# Patient Record
Sex: MALE | Race: WHITE | Employment: OTHER | URBAN - METROPOLITAN AREA
[De-identification: names, ages, dates, MRNs, and addresses within clinical notes are randomized per-mention and may not be internally consistent; named-entity substitution may affect disease eponyms.]

---

## 2018-06-22 ENCOUNTER — LAB ENCOUNTER (OUTPATIENT)
Dept: LAB | Age: 53
End: 2018-06-22
Attending: FAMILY MEDICINE
Payer: MEDICAID

## 2018-06-22 ENCOUNTER — OFFICE VISIT (OUTPATIENT)
Dept: FAMILY MEDICINE CLINIC | Facility: CLINIC | Age: 53
End: 2018-06-22

## 2018-06-22 VITALS
HEART RATE: 56 BPM | RESPIRATION RATE: 16 BRPM | SYSTOLIC BLOOD PRESSURE: 110 MMHG | DIASTOLIC BLOOD PRESSURE: 72 MMHG | BODY MASS INDEX: 27.51 KG/M2 | WEIGHT: 190 LBS | HEIGHT: 69.5 IN | TEMPERATURE: 98 F

## 2018-06-22 DIAGNOSIS — Z12.5 SCREENING FOR PROSTATE CANCER: ICD-10-CM

## 2018-06-22 DIAGNOSIS — R53.83 FATIGUE, UNSPECIFIED TYPE: ICD-10-CM

## 2018-06-22 DIAGNOSIS — Z00.00 LABORATORY EXAMINATION ORDERED AS PART OF A ROUTINE GENERAL MEDICAL EXAMINATION: ICD-10-CM

## 2018-06-22 DIAGNOSIS — R22.9 SUBCUTANEOUS NODULE: ICD-10-CM

## 2018-06-22 DIAGNOSIS — Z13.89 SCREENING FOR GENITOURINARY CONDITION: ICD-10-CM

## 2018-06-22 DIAGNOSIS — Z00.00 PHYSICAL EXAM, ANNUAL: Primary | ICD-10-CM

## 2018-06-22 DIAGNOSIS — L40.9 PSORIASIS: ICD-10-CM

## 2018-06-22 DIAGNOSIS — F32.A DEPRESSION, UNSPECIFIED DEPRESSION TYPE: ICD-10-CM

## 2018-06-22 PROCEDURE — 99386 PREV VISIT NEW AGE 40-64: CPT | Performed by: FAMILY MEDICINE

## 2018-06-22 PROCEDURE — 85025 COMPLETE CBC W/AUTO DIFF WBC: CPT

## 2018-06-22 PROCEDURE — 80053 COMPREHEN METABOLIC PANEL: CPT

## 2018-06-22 PROCEDURE — 80061 LIPID PANEL: CPT

## 2018-06-22 PROCEDURE — 84443 ASSAY THYROID STIM HORMONE: CPT

## 2018-06-22 PROCEDURE — 81003 URINALYSIS AUTO W/O SCOPE: CPT

## 2018-06-22 PROCEDURE — 82306 VITAMIN D 25 HYDROXY: CPT

## 2018-06-22 PROCEDURE — 82607 VITAMIN B-12: CPT

## 2018-06-22 PROCEDURE — 36415 COLL VENOUS BLD VENIPUNCTURE: CPT

## 2018-06-22 PROCEDURE — 84153 ASSAY OF PSA TOTAL: CPT

## 2018-06-22 RX ORDER — TRAZODONE HYDROCHLORIDE 100 MG/1
100 TABLET ORAL NIGHTLY
COMMUNITY
End: 2018-08-20 | Stop reason: ALTCHOICE

## 2018-06-22 RX ORDER — CLONAZEPAM 0.5 MG/1
0.5 TABLET ORAL DAILY
COMMUNITY
End: 2019-06-03

## 2018-06-22 RX ORDER — SERTRALINE HYDROCHLORIDE 100 MG/1
100 TABLET, FILM COATED ORAL 2 TIMES DAILY
COMMUNITY
End: 2019-09-23 | Stop reason: ALTCHOICE

## 2018-06-22 RX ORDER — ARIPIPRAZOLE 15 MG/1
15 TABLET ORAL DAILY
COMMUNITY
End: 2019-06-03 | Stop reason: DRUGHIGH

## 2018-06-22 NOTE — PROGRESS NOTES
Bobbi Bullock is a 48year old male who presents for a complete physical exam.   HPI:   Pt complains of being treated for depression. He is working with psychiatrist Dr Jessica Beltran on his mood condition.   No suicidal.  He says that it is a otherwise  SKIN: denies any unusual skin lesions  EYES:denies blurred vision or double vision  HEENT: denies nasal congestion, sinus pain or ST  LUNGS: denies shortness of breath with exertion  CARDIOVASCULAR: denies chest pain on exertion  GI: denies abdo TSH W REFLEX TO FREE T4      VITAMIN D, 25-HYDROXY      VITAMIN B12    Meds & Refills for this Visit:  No prescriptions requested or ordered in this encounter     Call [phone] to schedule US of the lump chest.   Healthy diet. Stay active.   Forward r

## 2018-06-22 NOTE — PATIENT INSTRUCTIONS
Call [phone] to schedule US of the lump chest.   Healthy diet. Stay active. Forward records from the previous doctor for the last 2 years.

## 2018-06-25 ENCOUNTER — TELEPHONE (OUTPATIENT)
Dept: FAMILY MEDICINE CLINIC | Facility: CLINIC | Age: 53
End: 2018-06-25

## 2018-06-25 NOTE — TELEPHONE ENCOUNTER
Medical Records request signed in office. Sent to: [facility], [facility]  [address], [address] [facility]  P @ [phone]  F @ [phone]    Faxed on 6/25/18 for records for continuation of care.      Release sent to scan to be put in pat

## 2018-06-26 NOTE — TELEPHONE ENCOUNTER
Medical Records received from [facility], [facility] on 6/26/18. Given to Dr. Michaud for review.

## 2018-07-03 ENCOUNTER — TELEPHONE (OUTPATIENT)
Dept: FAMILY MEDICINE CLINIC | Facility: CLINIC | Age: 53
End: 2018-07-03

## 2018-07-03 NOTE — TELEPHONE ENCOUNTER
Call to pt-sts dr Tone Larkin discussed polish speaking psychiatrist w him at last ofc visit. Mayda Jose gave me a business card for a dr but I keep calling and no one answers. \"  Socrates Larkin is out of ofc today-with holiday, will return Thursday, 7/ Per EPIC Pt had INR done today 3/15/18

## 2018-07-03 NOTE — TELEPHONE ENCOUNTER
Call to pt-advised of dr nova's recommendations noted below and explained rationale. Patient voices understanding/agrees with plan/no further questions.

## 2018-07-03 NOTE — TELEPHONE ENCOUNTER
I think patient needs to at this point  Needs to call his insurance and find out who will be a psychiatrist in his network possibly Adi speaking he can see.   There is Dr. Iraj Brown at [facility] speaking psychiatrist but I am not sure if they acc

## 2018-07-03 NOTE — TELEPHONE ENCOUNTER
Patient is asking for the name of the Psychiatrist that Dr. Kinsey Duran recommended that speaks [place]. Please advise at [phone]. Thank you.

## 2018-07-17 ENCOUNTER — TELEPHONE (OUTPATIENT)
Dept: FAMILY MEDICINE CLINIC | Facility: CLINIC | Age: 53
End: 2018-07-17

## 2018-07-17 NOTE — TELEPHONE ENCOUNTER
Left msg to      The call is to confirm that order was US of chest (not chest xray) to evaluate a subcutaneous nodule at his left upper chest wall. (see notes from 6/22 OV)

## 2018-07-17 NOTE — TELEPHONE ENCOUNTER
Pt is asking what he should do next. There is a US ordered but he thought he was supposed to do a xray. Please advise. Thank you.

## 2018-07-31 ENCOUNTER — HOSPITAL ENCOUNTER (OUTPATIENT)
Dept: ULTRASOUND IMAGING | Facility: HOSPITAL | Age: 53
Discharge: HOME | End: 2018-07-31
Attending: FAMILY MEDICINE
Payer: MEDICAID

## 2018-07-31 DIAGNOSIS — R22.9 SUBCUTANEOUS NODULE: ICD-10-CM

## 2018-07-31 PROCEDURE — 76705 ECHO EXAM OF ABDOMEN: CPT | Performed by: FAMILY MEDICINE

## 2018-08-16 ENCOUNTER — TELEPHONE (OUTPATIENT)
Dept: FAMILY MEDICINE CLINIC | Facility: CLINIC | Age: 53
End: 2018-08-16

## 2018-08-16 NOTE — TELEPHONE ENCOUNTER
Can we check with patient tomorrow if  we would have opening  on Monday,  Or we can try to see if he would have a cancellation for tomorrow then we will call him for visit. I would prefer have an hour however for him.     He would have to call his psychia

## 2018-08-16 NOTE — TELEPHONE ENCOUNTER
See providers notes    Call pt estefani    On 8/20 has HFU , we can use his estefani if still available

## 2018-08-16 NOTE — TELEPHONE ENCOUNTER
Pt wanting an appt to discuss US of lower back    Also, reported feeling---  Insomia   Anxious  Mood changes   Easily angered  No suicidal nor homicidal ideation     He saw his psyche last Mon  On Zoloft, Buspar, Trazadone, Clonazepam and Ambien  Will see

## 2018-08-17 NOTE — TELEPHONE ENCOUNTER
MARIO for pt to cb. Please offer him Monday 330 p to disucss u/s results and be worked up for [place], frequent urination--it looks like this spot is being held for pt per Dr Judd Loveless schedule.   Per notes below regarding mood, he needs to still continue with pys

## 2018-08-20 ENCOUNTER — OFFICE VISIT (OUTPATIENT)
Dept: FAMILY MEDICINE CLINIC | Facility: CLINIC | Age: 53
End: 2018-08-20
Payer: MEDICAID

## 2018-08-20 VITALS
HEART RATE: 66 BPM | RESPIRATION RATE: 16 BRPM | SYSTOLIC BLOOD PRESSURE: 118 MMHG | DIASTOLIC BLOOD PRESSURE: 76 MMHG | HEIGHT: 69.5 IN | TEMPERATURE: 97 F | WEIGHT: 200 LBS | BODY MASS INDEX: 28.96 KG/M2

## 2018-08-20 DIAGNOSIS — R14.0 ABDOMINAL BLOATING: ICD-10-CM

## 2018-08-20 DIAGNOSIS — L40.9 PSORIASIS: ICD-10-CM

## 2018-08-20 DIAGNOSIS — F32.A DEPRESSION, UNSPECIFIED DEPRESSION TYPE: ICD-10-CM

## 2018-08-20 DIAGNOSIS — H04.203 EYE TEARING, BILATERAL: Primary | ICD-10-CM

## 2018-08-20 DIAGNOSIS — R53.83 FATIGUE, UNSPECIFIED TYPE: ICD-10-CM

## 2018-08-20 PROCEDURE — 99214 OFFICE O/P EST MOD 30 MIN: CPT | Performed by: FAMILY MEDICINE

## 2018-08-20 RX ORDER — FLUTICASONE PROPIONATE 50 MCG
2 SPRAY, SUSPENSION (ML) NASAL DAILY
Qty: 1 BOTTLE | Refills: 1 | Status: SHIPPED | OUTPATIENT
Start: 2018-08-20 | End: 2019-02-21

## 2018-08-20 RX ORDER — FLUTICASONE PROPIONATE 50 MCG
2 SPRAY, SUSPENSION (ML) NASAL DAILY
Qty: 1 BOTTLE | Refills: 2 | Status: SHIPPED | OUTPATIENT
Start: 2018-08-20 | End: 2018-11-01

## 2018-08-20 RX ORDER — BUPROPION HYDROCHLORIDE 75 MG/1
75 TABLET ORAL DAILY
COMMUNITY
End: 2019-06-03 | Stop reason: DRUGHIGH

## 2018-08-20 RX ORDER — ZOLPIDEM TARTRATE 10 MG/1
10 TABLET ORAL NIGHTLY PRN
COMMUNITY

## 2018-08-20 NOTE — PROGRESS NOTES
Robbie Espino is a 48year old male. cc tearing of the eyes, abdominal bloating, fatigue, depression, psoriasis  HPI:   Patient is coming to the office for evaluation of the symptoms which he is experiencing for the past 2 3 weeks.   He noticed to have acetonide 0.1 % External Ointment Apply 1 Application topically 2 (two) times daily. Disp: 30 g Rfl: 1   aripiprazole 15 MG Oral Tab Take 15 mg by mouth daily. Disp:  Rfl:    Sertraline HCl 100 MG Oral Tab Take 100 mg by mouth 2 (two) times daily.  Disp:  R unspecified depression type      Orders Placed This Encounter      Allergens, Zone 8 [E]      Adult Food Allergy Prof [E]    Meds & Refills for this Visit:  Signed Prescriptions Disp Refills    Fluticasone Propionate 50 MCG/ACT Nasal Suspension 1 Bottle 2

## 2018-08-20 NOTE — PATIENT INSTRUCTIONS
Check if your insurance cover food allergy testing and environmental allergy testing for blood for diagnosis of tearing eyes abdominal bloating fatigue. Use Flonase 2 sprays nostril once a day. Use triamcinolone ointment as needed to rash from psoriasis.

## 2018-10-31 ENCOUNTER — TELEPHONE (OUTPATIENT)
Dept: FAMILY MEDICINE CLINIC | Facility: CLINIC | Age: 53
End: 2018-10-31

## 2018-10-31 NOTE — TELEPHONE ENCOUNTER
SENT BACK LIVE     1. What are your symptoms? Pt is having stomach pain a 6 or 7 consistently when he eats or even drink water he wants to throw up, bowel movements are 3 or 4 days apart. 2. How long have you been having these symptoms?   For a wee

## 2018-10-31 NOTE — TELEPHONE ENCOUNTER
C/O stomach/abdominal pain for a week off and on-6-7/10. No matter what he eats it starts to \"come back up in my throat\"-even in shower. No fevers or urination issues, BM's very 3-4 days with help of flax seeds and increased water.  Has appointment with MICHAEL

## 2018-10-31 NOTE — TELEPHONE ENCOUNTER
Call to pt-advised of dr's recommendation for ofc evaluation appt tomorrow 4pm.  Patient voices understanding/agrees with plan/no further questions. appt scheduled.

## 2018-11-01 ENCOUNTER — OFFICE VISIT (OUTPATIENT)
Dept: FAMILY MEDICINE CLINIC | Facility: CLINIC | Age: 53
End: 2018-11-01
Payer: MEDICAID

## 2018-11-01 VITALS
HEART RATE: 74 BPM | BODY MASS INDEX: 28.67 KG/M2 | WEIGHT: 198 LBS | TEMPERATURE: 100 F | RESPIRATION RATE: 12 BRPM | DIASTOLIC BLOOD PRESSURE: 80 MMHG | HEIGHT: 69.5 IN | SYSTOLIC BLOOD PRESSURE: 130 MMHG

## 2018-11-01 DIAGNOSIS — R11.0 NAUSEA: ICD-10-CM

## 2018-11-01 DIAGNOSIS — R10.13 ABDOMINAL PAIN, EPIGASTRIC: ICD-10-CM

## 2018-11-01 DIAGNOSIS — R10.811 RIGHT UPPER QUADRANT ABDOMINAL TENDERNESS WITHOUT REBOUND TENDERNESS: ICD-10-CM

## 2018-11-01 DIAGNOSIS — R12 HEARTBURN: ICD-10-CM

## 2018-11-01 DIAGNOSIS — R10.9 ABDOMINAL CRAMPS: Primary | ICD-10-CM

## 2018-11-01 PROCEDURE — 99214 OFFICE O/P EST MOD 30 MIN: CPT | Performed by: FAMILY MEDICINE

## 2018-11-01 RX ORDER — DICYCLOMINE HCL 20 MG
20 TABLET ORAL
Qty: 60 TABLET | Refills: 0 | Status: SHIPPED | OUTPATIENT
Start: 2018-11-01 | End: 2019-09-23 | Stop reason: ALTCHOICE

## 2018-11-01 RX ORDER — OMEPRAZOLE 40 MG/1
40 CAPSULE, DELAYED RELEASE ORAL
Qty: 30 CAPSULE | Refills: 1 | Status: SHIPPED | OUTPATIENT
Start: 2018-11-01 | End: 2019-09-23 | Stop reason: ALTCHOICE

## 2018-11-01 NOTE — PROGRESS NOTES
Carlee Ramos is a 48year old male. cc abdominal discomfort, heartburn nausea  HPI:   Patient comes to the office to discuss multiple symptoms.   Started for over 10 days to have some abdominal discomfort in the upper abdomen sometimes in the lower ab Tab Take 15 mg by mouth daily. Disp:  Rfl:    Sertraline HCl 100 MG Oral Tab Take 100 mg by mouth 2 (two) times daily. Disp:  Rfl:    ClonazePAM 0.5 MG Oral Tab Take 0.5 mg by mouth daily.  Disp:  Rfl:       Past Medical History:   Diagnosis Date   • Luis METABOLIC PANEL      Lipase [E]      H. Pylori Stool Ag, EIA [E]      Meds & Refills for this Visit:  Requested Prescriptions     Signed Prescriptions Disp Refills   • Dicyclomine HCl 20 MG Oral Tab 60 tablet 0     Sig: Take 1 tablet (20 mg total) by mouth

## 2018-11-01 NOTE — PATIENT INSTRUCTIONS
Omeprazole 40 mg 1 tab 30 min before breakfast.   Dicyclomine before breakfast and at bed time. Probiotic - Align over the counter daily. Keep good hydration. Do blood tests and stool tests   Call [phone] to schedule US abdomen.

## 2018-11-02 ENCOUNTER — TELEPHONE (OUTPATIENT)
Dept: FAMILY MEDICINE CLINIC | Facility: CLINIC | Age: 53
End: 2018-11-02

## 2018-11-02 ENCOUNTER — LAB ENCOUNTER (OUTPATIENT)
Dept: LAB | Age: 53
End: 2018-11-02
Attending: FAMILY MEDICINE
Payer: MEDICAID

## 2018-11-02 DIAGNOSIS — R10.811 RIGHT UPPER QUADRANT ABDOMINAL TENDERNESS WITHOUT REBOUND TENDERNESS: ICD-10-CM

## 2018-11-02 DIAGNOSIS — R14.0 ABDOMINAL BLOATING: ICD-10-CM

## 2018-11-02 DIAGNOSIS — R53.83 FATIGUE, UNSPECIFIED TYPE: ICD-10-CM

## 2018-11-02 DIAGNOSIS — H04.203 EYE TEARING, BILATERAL: ICD-10-CM

## 2018-11-02 DIAGNOSIS — R10.9 ABDOMINAL CRAMPS: ICD-10-CM

## 2018-11-02 DIAGNOSIS — R10.13 ABDOMINAL PAIN, EPIGASTRIC: ICD-10-CM

## 2018-11-02 PROCEDURE — 80053 COMPREHEN METABOLIC PANEL: CPT

## 2018-11-02 PROCEDURE — 86003 ALLG SPEC IGE CRUDE XTRC EA: CPT

## 2018-11-02 PROCEDURE — 85025 COMPLETE CBC W/AUTO DIFF WBC: CPT

## 2018-11-02 PROCEDURE — 83690 ASSAY OF LIPASE: CPT

## 2018-11-02 PROCEDURE — 36415 COLL VENOUS BLD VENIPUNCTURE: CPT

## 2018-11-02 PROCEDURE — 87338 HPYLORI STOOL AG IA: CPT

## 2018-11-02 PROCEDURE — 82785 ASSAY OF IGE: CPT

## 2018-11-06 ENCOUNTER — TELEPHONE (OUTPATIENT)
Dept: FAMILY MEDICINE CLINIC | Facility: CLINIC | Age: 53
End: 2018-11-06

## 2018-11-09 RX ORDER — LANSOPRAZOLE 30 MG/1
30 CAPSULE, DELAYED RELEASE ORAL 2 TIMES DAILY
Qty: 28 CAPSULE | Refills: 0 | Status: SHIPPED | OUTPATIENT
Start: 2018-11-09 | End: 2018-11-23

## 2018-11-09 RX ORDER — AMOXICILLIN 500 MG/1
1000 CAPSULE ORAL 2 TIMES DAILY
Qty: 56 CAPSULE | Refills: 0 | Status: SHIPPED | OUTPATIENT
Start: 2018-11-09 | End: 2018-11-23

## 2018-11-09 RX ORDER — CLARITHROMYCIN 500 MG/1
500 TABLET, COATED ORAL 2 TIMES DAILY
Qty: 28 TABLET | Refills: 0 | Status: SHIPPED | OUTPATIENT
Start: 2018-11-09 | End: 2018-11-23

## 2018-12-11 ENCOUNTER — TELEPHONE (OUTPATIENT)
Dept: FAMILY MEDICINE CLINIC | Facility: CLINIC | Age: 53
End: 2018-12-11

## 2018-12-11 NOTE — TELEPHONE ENCOUNTER
Pt states at his last OV on 11/1, he filled out a medical release form and gave to Dr. Nicole Alex for his last OV notes and labs to be sent to his psychiatrist, Dr. Romayne Edelman (fax:  [phone], ph:  [phone].   Pt has appt January 3 and needs re

## 2019-01-03 ENCOUNTER — TELEPHONE (OUTPATIENT)
Dept: FAMILY MEDICINE CLINIC | Facility: CLINIC | Age: 54
End: 2019-01-03

## 2019-01-03 NOTE — TELEPHONE ENCOUNTER
Medical Records Request received from [facility] [place] [facility] for records from 1/30/18 to present. Records to be sent to:    [facility] Rohan Lemons    Release sent to [employer] on 1/3/2

## 2019-01-24 NOTE — TELEPHONE ENCOUNTER
TRIAMCINOLONE 0.1% OINTMENT 30GM    Non protocol medication. Please see pended medications. Please sign if appropriate. Thank you    His last script was sent on 08/20/2018 for 30 g/1 refill. Last physical was done 06/22/2018.

## 2019-02-19 ENCOUNTER — TELEPHONE (OUTPATIENT)
Dept: FAMILY MEDICINE CLINIC | Facility: CLINIC | Age: 54
End: 2019-02-19

## 2019-02-19 DIAGNOSIS — H57.89 RED EYE: Primary | ICD-10-CM

## 2019-02-19 NOTE — TELEPHONE ENCOUNTER
Per [place] at [facility] will fax over clinical record per Dr. Betzy Powell request.

## 2019-02-19 NOTE — TELEPHONE ENCOUNTER
Patient is at [facility] right now. He has running red irritated eye. They are asking for Dr. Keke Henry to write a letter stating that he is at [facility] right now and is being seen and fax to [phone]. Printed and gave to Dr. Keke Henry.

## 2019-02-21 RX ORDER — FLUTICASONE PROPIONATE 50 MCG
SPRAY, SUSPENSION (ML) NASAL
Qty: 1 INHALER | Refills: 0 | Status: SHIPPED | OUTPATIENT
Start: 2019-02-21 | End: 2019-08-22

## 2019-02-21 NOTE — TELEPHONE ENCOUNTER
TRIAMCINOLONE 0.1% OINTMENT 30GM    Non protocol medication. Please see pended medications. Please sign if appropriate. Thank you    Last refill was on 01/24/2019.

## 2019-04-29 RX ORDER — AMOXICILLIN 500 MG/1
CAPSULE ORAL
Qty: 56 CAPSULE | Refills: 0 | OUTPATIENT
Start: 2019-04-29

## 2019-04-29 RX ORDER — CLARITHROMYCIN 500 MG/1
TABLET, COATED ORAL
Qty: 28 TABLET | Refills: 0 | OUTPATIENT
Start: 2019-04-29

## 2019-05-01 RX ORDER — TRIAMCINOLONE ACETONIDE 1 MG/G
OINTMENT TOPICAL
Qty: 30 G | Refills: 0 | OUTPATIENT
Start: 2019-05-01

## 2019-06-03 ENCOUNTER — OFFICE VISIT (OUTPATIENT)
Dept: FAMILY MEDICINE CLINIC | Facility: CLINIC | Age: 54
End: 2019-06-03
Payer: MEDICAID

## 2019-06-03 ENCOUNTER — LAB ENCOUNTER (OUTPATIENT)
Dept: LAB | Age: 54
End: 2019-06-03
Attending: Other
Payer: MEDICAID

## 2019-06-03 VITALS
OXYGEN SATURATION: 98 % | TEMPERATURE: 99 F | RESPIRATION RATE: 18 BRPM | HEART RATE: 89 BPM | HEIGHT: 69.5 IN | WEIGHT: 202 LBS | DIASTOLIC BLOOD PRESSURE: 82 MMHG | SYSTOLIC BLOOD PRESSURE: 122 MMHG | BODY MASS INDEX: 29.25 KG/M2

## 2019-06-03 DIAGNOSIS — M54.9 UPPER BACK PAIN: ICD-10-CM

## 2019-06-03 DIAGNOSIS — Z79.899 ENCOUNTER FOR LONG-TERM (CURRENT) USE OF OTHER MEDICATIONS: Primary | ICD-10-CM

## 2019-06-03 DIAGNOSIS — M25.562 PAIN IN BOTH KNEES, UNSPECIFIED CHRONICITY: ICD-10-CM

## 2019-06-03 DIAGNOSIS — L40.9 PSORIASIS: ICD-10-CM

## 2019-06-03 DIAGNOSIS — M25.50 ARTHRALGIA, UNSPECIFIED JOINT: Primary | ICD-10-CM

## 2019-06-03 DIAGNOSIS — M25.561 PAIN IN BOTH KNEES, UNSPECIFIED CHRONICITY: ICD-10-CM

## 2019-06-03 PROCEDURE — 85025 COMPLETE CBC W/AUTO DIFF WBC: CPT

## 2019-06-03 PROCEDURE — 99214 OFFICE O/P EST MOD 30 MIN: CPT | Performed by: FAMILY MEDICINE

## 2019-06-03 PROCEDURE — 80061 LIPID PANEL: CPT

## 2019-06-03 PROCEDURE — 80053 COMPREHEN METABOLIC PANEL: CPT

## 2019-06-03 PROCEDURE — 83036 HEMOGLOBIN GLYCOSYLATED A1C: CPT

## 2019-06-03 PROCEDURE — 36415 COLL VENOUS BLD VENIPUNCTURE: CPT

## 2019-06-03 RX ORDER — ARIPIPRAZOLE 20 MG/1
20 TABLET ORAL DAILY
COMMUNITY

## 2019-06-03 RX ORDER — BUPROPION HYDROCHLORIDE 150 MG/1
1 TABLET ORAL DAILY
Refills: 1 | COMMUNITY
Start: 2019-05-31

## 2019-06-03 RX ORDER — DICLOFENAC SODIUM 75 MG/1
75 TABLET, DELAYED RELEASE ORAL 2 TIMES DAILY
Qty: 60 TABLET | Refills: 0 | Status: SHIPPED | OUTPATIENT
Start: 2019-06-03 | End: 2019-06-30

## 2019-06-03 NOTE — PATIENT INSTRUCTIONS
Call [phone] to schedule  x-ray of bilateral knees. Do blood work fasting. Diclofenac 75 mg 1 tablet twice a day take it with food this is anti-inflammatory medication. Use triamcinolone ointment as needed.

## 2019-06-04 NOTE — PROGRESS NOTES
Preethi Bryan is a 47year old male. cc joint aches, bilateral knee pain, psoriasis  HPI:   Patient is come to the office complaining of having joint aches. Most of the pain and discomfort patient has in his knees especially on the right side.   The pa capsule (40 mg total) by mouth every morning before breakfast. Disp: 30 capsule Rfl: 1   Zolpidem Tartrate 10 MG Oral Tab Take 10 mg by mouth nightly as needed for Sleep.  Disp:  Rfl:    Sertraline HCl 100 MG Oral Tab Take 100 mg by mouth 2 (two) times simin oriented x3  Results for orders placed or performed in visit on 21/49/51   COMP METABOLIC PANEL (14)   Result Value Ref Range    Glucose 99 70 - 99 mg/dL    Sodium 136 136 - 145 mmol/L    Potassium 3.7 3.5 - 5.1 mmol/L    Chloride 102 98 - 112 mmol/L    CO Eosinophil % 0.8 %    Basophil % 0.2 %    Immature Granulocyte % 0.4 %     ASSESSMENT AND PLAN:     Arthralgia, unspecified joint  (primary encounter diagnosis)  Upper back pain  Pain in both knees, unspecified chronicity  Psoriasis    Orders Placed This E

## 2019-06-13 ENCOUNTER — TELEPHONE (OUTPATIENT)
Dept: FAMILY MEDICINE CLINIC | Facility: CLINIC | Age: 54
End: 2019-06-13

## 2019-06-13 NOTE — TELEPHONE ENCOUNTER
[facility] to present. Records to be sent to:   [facility]  [address], [place]  [address]    Release sent to [employer] on 6/13/19.   Release also sent to

## 2019-07-01 RX ORDER — DICLOFENAC SODIUM 75 MG/1
TABLET, DELAYED RELEASE ORAL
Qty: 60 TABLET | Refills: 0 | Status: SHIPPED | OUTPATIENT
Start: 2019-07-01 | End: 2019-07-24

## 2019-07-01 NOTE — TELEPHONE ENCOUNTER
DICLOFENAC SODIUM 75MG DR TABLETS    Non protocol medication. Please see pended medications. Please sign if appropriate. Thank you    His last medication OV was on 06/03/2019.

## 2019-07-26 RX ORDER — DICLOFENAC SODIUM 75 MG/1
TABLET, DELAYED RELEASE ORAL
Qty: 60 TABLET | Refills: 0 | Status: SHIPPED | OUTPATIENT
Start: 2019-07-26 | End: 2019-08-25

## 2019-07-26 NOTE — TELEPHONE ENCOUNTER
Non protocol medication, last refill 7/1/2019(qty-60). Last office visit 6/3/2019.   Please approve if appropriate, thank you

## 2019-08-14 ENCOUNTER — HOSPITAL ENCOUNTER (OUTPATIENT)
Dept: GENERAL RADIOLOGY | Age: 54
Discharge: HOME | End: 2019-08-14
Attending: FAMILY MEDICINE
Payer: MEDICAID

## 2019-08-14 DIAGNOSIS — L40.9 PSORIASIS: ICD-10-CM

## 2019-08-14 DIAGNOSIS — M25.561 PAIN IN BOTH KNEES, UNSPECIFIED CHRONICITY: ICD-10-CM

## 2019-08-14 DIAGNOSIS — M25.562 PAIN IN BOTH KNEES, UNSPECIFIED CHRONICITY: ICD-10-CM

## 2019-08-14 PROCEDURE — 73560 X-RAY EXAM OF KNEE 1 OR 2: CPT | Performed by: FAMILY MEDICINE

## 2019-08-22 RX ORDER — FLUTICASONE PROPIONATE 50 MCG
SPRAY, SUSPENSION (ML) NASAL
Qty: 1 INHALER | Refills: 1 | Status: SHIPPED | OUTPATIENT
Start: 2019-08-22 | End: 2019-10-17

## 2019-08-27 RX ORDER — DICLOFENAC SODIUM 75 MG/1
TABLET, DELAYED RELEASE ORAL
Qty: 60 TABLET | Refills: 0 | Status: SHIPPED | OUTPATIENT
Start: 2019-08-27 | End: 2019-09-23 | Stop reason: ALTCHOICE

## 2019-08-27 NOTE — TELEPHONE ENCOUNTER
Not protocol medication  LOV: 6/3/19  Next appt: 9/23/19 physical    Diclofenac  Last refill: 7/26/19, 60 tabs    Please refill if appropriate.  Thank you

## 2019-09-23 ENCOUNTER — OFFICE VISIT (OUTPATIENT)
Dept: FAMILY MEDICINE CLINIC | Facility: CLINIC | Age: 54
End: 2019-09-23
Payer: MEDICAID

## 2019-09-23 VITALS
HEART RATE: 72 BPM | HEIGHT: 69.5 IN | RESPIRATION RATE: 18 BRPM | SYSTOLIC BLOOD PRESSURE: 118 MMHG | BODY MASS INDEX: 29.53 KG/M2 | DIASTOLIC BLOOD PRESSURE: 78 MMHG | TEMPERATURE: 98 F | OXYGEN SATURATION: 97 % | WEIGHT: 204 LBS

## 2019-09-23 DIAGNOSIS — Z12.11 COLON CANCER SCREENING: ICD-10-CM

## 2019-09-23 DIAGNOSIS — Z00.00 PHYSICAL EXAM, ANNUAL: Primary | ICD-10-CM

## 2019-09-23 DIAGNOSIS — Z12.11 SCREEN FOR COLON CANCER: ICD-10-CM

## 2019-09-23 DIAGNOSIS — Z13.89 SCREENING FOR GENITOURINARY CONDITION: ICD-10-CM

## 2019-09-23 DIAGNOSIS — Z00.00 LABORATORY EXAMINATION ORDERED AS PART OF A ROUTINE GENERAL MEDICAL EXAMINATION: ICD-10-CM

## 2019-09-23 DIAGNOSIS — Z12.5 SCREENING FOR MALIGNANT NEOPLASM OF PROSTATE: ICD-10-CM

## 2019-09-23 PROCEDURE — 99396 PREV VISIT EST AGE 40-64: CPT | Performed by: FAMILY MEDICINE

## 2019-09-23 RX ORDER — CLONAZEPAM 1 MG/1
1 TABLET ORAL 2 TIMES DAILY PRN
COMMUNITY

## 2019-09-24 NOTE — PROGRESS NOTES
Guillermo Peck is a 47year old male who presents for a complete physical exam.   HPI:   Pt complains of being treated for depression. He is working with psychiatrist Dr Kaylee Garcia on his mood condition.   No suicidal.   Patient colonoscopy Smoking status: Never Smoker      Smokeless tobacco: Never Used    Alcohol use: No    Drug use: No     Occ: On disability : yes. Children: one   Exercise: three times per week.   Diet: watches calories closely     REVIEW OF SYSTEMS:   GENERAL: feel as part of a routine general medical examination  Colon cancer screening  Screening for malignant neoplasm of prostate  Screening for genitourinary condition  Screen for colon cancer    Orders Placed This Encounter      PSA (Screening) [E]      CBC W/DIFF

## 2019-10-14 RX ORDER — TRIAMCINOLONE ACETONIDE 1 MG/G
OINTMENT TOPICAL
Qty: 30 G | Refills: 0 | Status: SHIPPED | OUTPATIENT
Start: 2019-10-14

## 2019-10-14 NOTE — TELEPHONE ENCOUNTER
Not protocol medication  LOV: 9/23/19 physical    Triamcinolone ointment  Last refill: 6/3/19, 30 g 2 refills    Please refill if appropriate. Thank you.

## 2019-10-17 ENCOUNTER — TELEPHONE (OUTPATIENT)
Dept: FAMILY MEDICINE CLINIC | Facility: CLINIC | Age: 54
End: 2019-10-17

## 2019-10-17 NOTE — TELEPHONE ENCOUNTER
Pt requesting both his knee Xrays be sent to fax # [phone] to [employer] Disability Claims. Faxed. Confirmation received.

## 2019-10-18 RX ORDER — FLUTICASONE PROPIONATE 50 MCG
SPRAY, SUSPENSION (ML) NASAL
Qty: 1 BOTTLE | Refills: 1 | Status: SHIPPED | OUTPATIENT
Start: 2019-10-18

## 2019-10-28 RX ORDER — TRIAMCINOLONE ACETONIDE 1 MG/G
OINTMENT TOPICAL
Qty: 30 G | Refills: 0 | OUTPATIENT
Start: 2019-10-28

## 2020-11-23 RX ORDER — TRIAMCINOLONE ACETONIDE 1 MG/G
OINTMENT TOPICAL
Qty: 30 G | Refills: 0 | OUTPATIENT
Start: 2020-11-23

## 2021-01-15 RX ORDER — FLUTICASONE PROPIONATE 50 MCG
SPRAY, SUSPENSION (ML) NASAL
Qty: 16 G | Refills: 0 | OUTPATIENT
Start: 2021-01-15